# Patient Record
Sex: MALE | URBAN - METROPOLITAN AREA
[De-identification: names, ages, dates, MRNs, and addresses within clinical notes are randomized per-mention and may not be internally consistent; named-entity substitution may affect disease eponyms.]

---

## 2017-10-09 NOTE — PATIENT DISCUSSION
Minimal risk for malignant transformation discussed with patient. Recommend to observe and follow with serial exams.

## 2021-01-18 ENCOUNTER — IMPORTED ENCOUNTER (OUTPATIENT)
Dept: URBAN - METROPOLITAN AREA CLINIC 9 | Facility: CLINIC | Age: 66
End: 2021-01-18

## 2021-02-04 ENCOUNTER — IMPORTED ENCOUNTER (OUTPATIENT)
Dept: URBAN - METROPOLITAN AREA CLINIC 9 | Facility: CLINIC | Age: 66
End: 2021-02-04

## 2021-02-10 ENCOUNTER — IMPORTED ENCOUNTER (OUTPATIENT)
Dept: URBAN - METROPOLITAN AREA CLINIC 9 | Facility: CLINIC | Age: 66
End: 2021-02-10

## 2021-02-24 ENCOUNTER — IMPORTED ENCOUNTER (OUTPATIENT)
Dept: URBAN - METROPOLITAN AREA CLINIC 9 | Facility: CLINIC | Age: 66
End: 2021-02-24

## 2021-04-27 ENCOUNTER — IMPORTED ENCOUNTER (OUTPATIENT)
Dept: URBAN - METROPOLITAN AREA CLINIC 9 | Facility: CLINIC | Age: 66
End: 2021-04-27

## 2021-10-16 ASSESSMENT — KERATOMETRY
OS_K1POWER_DIOPTERS: 44
OD_K2POWER_DIOPTERS: 45
OD_K1POWER_DIOPTERS: 44.25
OS_AXISANGLE2_DEGREES: 7
OD_AXISANGLE2_DEGREES: 3
OD_AXISANGLE_DEGREES: 93
OS_K2POWER_DIOPTERS: 44.25
OS_AXISANGLE_DEGREES: 97

## 2021-10-16 ASSESSMENT — TONOMETRY
OD_IOP_MMHG: 23
OS_IOP_MMHG: 14
OS_IOP_MMHG: 16
OD_IOP_MMHG: 15
OD_IOP_MMHG: 16

## 2021-10-16 ASSESSMENT — VISUAL ACUITY
OD_CC: 20/20 - SN
OS_SC: 20/25 -2 SN
OD_CC: 20/25 + SN
OD_SC: 20/30 - SN
OD_CC: 20/20 SN
OD_SC: 20/40 SN
OS_CC: 20/20 SN
OS_SC: 20/25 SN
OS_CC: 20/20 SN
OD_CC: 20/20 SN
OD_SC: 20/20 - SN